# Patient Record
Sex: FEMALE | URBAN - METROPOLITAN AREA
[De-identification: names, ages, dates, MRNs, and addresses within clinical notes are randomized per-mention and may not be internally consistent; named-entity substitution may affect disease eponyms.]

---

## 2020-07-20 ENCOUNTER — RECORDS - HEALTHEAST (OUTPATIENT)
Dept: SCHEDULING | Facility: CLINIC | Age: 35
End: 2020-07-20

## 2020-07-23 ENCOUNTER — COMMUNICATION - HEALTHEAST (OUTPATIENT)
Dept: SCHEDULING | Facility: CLINIC | Age: 35
End: 2020-07-23

## 2020-08-21 ENCOUNTER — COMMUNICATION - HEALTHEAST (OUTPATIENT)
Dept: SCHEDULING | Facility: CLINIC | Age: 35
End: 2020-08-21

## 2020-10-20 ENCOUNTER — RECORDS - HEALTHEAST (OUTPATIENT)
Dept: SCHEDULING | Facility: CLINIC | Age: 35
End: 2020-10-20

## 2020-10-20 DIAGNOSIS — Z3A.08 8 WEEKS GESTATION OF PREGNANCY: ICD-10-CM

## 2021-04-12 ENCOUNTER — RECORDS - HEALTHEAST (OUTPATIENT)
Dept: SCHEDULING | Facility: CLINIC | Age: 36
End: 2021-04-12

## 2021-04-26 ENCOUNTER — RECORDS - HEALTHEAST (OUTPATIENT)
Dept: SCHEDULING | Facility: CLINIC | Age: 36
End: 2021-04-26

## 2021-06-10 NOTE — TELEPHONE ENCOUNTER
Test chart used for protocol training.     Reason for Disposition    Baby moving normally OR normal kick count    Protocols used: PREGNANCY - DECREASED FETAL MOVEMENT-A-[place]

## 2021-06-16 NOTE — TELEPHONE ENCOUNTER
Error.    America Gaspar RN, BSN Nurse Triage Advisor 7:11 PM 4/12/2021       Reason for Disposition    SEVERE vaginal bleeding (e.g., continuous red blood from vagina, or large blood clots)    Additional Information    Negative: Passed out (i.e., fainted, collapsed and was not responding)    Negative: Shock suspected (e.g., cold/pale/clammy skin, too weak to stand, low BP, rapid pulse)    Negative: Difficult to awaken or acting confused (e.g., disoriented, slurred speech)    Protocols used: PREGNANCY - VAGINAL BLEEDING GREATER THAN 20 WEEKS EGA-A-[place]

## 2021-06-19 ENCOUNTER — RECORDS - HEALTHEAST (OUTPATIENT)
Dept: SCHEDULING | Facility: CLINIC | Age: 36
End: 2021-06-19

## 2021-06-26 NOTE — TELEPHONE ENCOUNTER
Error.    America Gaspar RN, BSN Nurse Triage Advisor 1:26 PM 6/19/2021         Reason for Disposition    Pregnant    [1] Pregnant > 36 weeks (term) AND [2] passed a small glob or chunk of mucous (may look like gelatin or snot)    Protocols used: VAGINAL [id number], PREGNANCY - VAGINAL [id number]

## 2021-07-19 ENCOUNTER — ALLIED HEALTH/NURSE VISIT (OUTPATIENT)
Dept: CARDIOLOGY | Facility: CLINIC | Age: 36
End: 2021-07-19

## 2021-07-19 DIAGNOSIS — Z00.6 EXAMINATION OF PARTICIPANT IN CLINICAL TRIAL: Primary | ICD-10-CM

## 2025-07-07 ENCOUNTER — TELEPHONE (OUTPATIENT)
Dept: FAMILY MEDICINE | Facility: CLINIC | Age: 40
End: 2025-07-07

## 2025-07-07 NOTE — TELEPHONE ENCOUNTER
Average Risk Category  No significant risk factors: No    At Risk Category (up to 3)  Teen pregnancy: No  Poor social situation: No  Domestic abuse: No  Financial difficulties: Yes  Smoker: Yes  H/O  deliver: Yes  H/O drug abuse: No  Non-English speaking: No  Advanced maternal age: Yes  GDM risks: No  Previous C/S: Yes  H/O PIH: Yes  H/O STIs: No  H/O mental health concerns: No  Onset care > 20 weeks: No  Other:     High Risk Category (4 or more At Risk or)  Diabetes/GDM: No  Multiple gestation: No  Chronic hypertension: No  Significant hx of asthma: No  Fetal demise > 20 weeks: No  Positive tox screen: No  Current mental health treatment: No  Other:     Risk: High Risk   Date determined: 2025  Emperatriz GLASS